# Patient Record
Sex: FEMALE | Race: BLACK OR AFRICAN AMERICAN | NOT HISPANIC OR LATINO | Employment: UNEMPLOYED | URBAN - METROPOLITAN AREA
[De-identification: names, ages, dates, MRNs, and addresses within clinical notes are randomized per-mention and may not be internally consistent; named-entity substitution may affect disease eponyms.]

---

## 2020-06-19 ENCOUNTER — HOSPITAL ENCOUNTER (EMERGENCY)
Facility: HOSPITAL | Age: 50
Discharge: HOME | End: 2020-06-20
Attending: FAMILY MEDICINE
Payer: OTHER GOVERNMENT

## 2020-06-19 VITALS
DIASTOLIC BLOOD PRESSURE: 78 MMHG | OXYGEN SATURATION: 100 % | SYSTOLIC BLOOD PRESSURE: 148 MMHG | WEIGHT: 193 LBS | HEART RATE: 70 BPM | TEMPERATURE: 98 F | RESPIRATION RATE: 20 BRPM

## 2020-06-19 DIAGNOSIS — U07.1 2019 NOVEL CORONAVIRUS DISEASE (COVID-19): Primary | ICD-10-CM

## 2020-06-19 LAB — SARS-COV-2 RDRP RESP QL NAA+PROBE: POSITIVE

## 2020-06-19 PROCEDURE — U0002 COVID-19 LAB TEST NON-CDC: HCPCS

## 2020-06-19 PROCEDURE — 99284 EMERGENCY DEPT VISIT MOD MDM: CPT

## 2020-06-19 NOTE — Clinical Note
Annabelle Kruger was seen and treated in our emergency department on 6/19/2020.  She may return to work on 06/27/2020.       If you have any questions or concerns, please don't hesitate to call.      Arianne Martines NP

## 2020-06-20 DIAGNOSIS — U07.1 COVID-19 VIRUS DETECTED: ICD-10-CM

## 2020-06-20 RX ORDER — IBUPROFEN 800 MG/1
800 TABLET ORAL EVERY 6 HOURS PRN
Qty: 20 TABLET | Refills: 0 | Status: SHIPPED | OUTPATIENT
Start: 2020-06-20

## 2020-06-20 RX ORDER — ONDANSETRON 4 MG/1
4 TABLET, FILM COATED ORAL EVERY 6 HOURS
Qty: 15 TABLET | Refills: 0 | Status: SHIPPED | OUTPATIENT
Start: 2020-06-20

## 2020-06-20 RX ORDER — ACETAMINOPHEN 500 MG
1000 TABLET ORAL EVERY 6 HOURS PRN
Qty: 24 TABLET | Refills: 0 | Status: SHIPPED | OUTPATIENT
Start: 2020-06-20 | End: 2020-06-27

## 2020-06-20 RX ORDER — DEXCHLORPHENIRAMINE MALEATE, DEXTROMETHORPHAN HBR, PHENYLEPHRINE HCL 1; 10; 5 MG/5ML; MG/5ML; MG/5ML
10 SYRUP ORAL EVERY 6 HOURS PRN
Qty: 1 BOTTLE | Refills: 0 | Status: SHIPPED | OUTPATIENT
Start: 2020-06-20 | End: 2020-06-27

## 2020-06-20 NOTE — ED PROVIDER NOTES
History      Chief Complaint   Patient presents with    COVID-19 Concerns      reports PUENTE, chills, onset wednesday; yesterday onset no sense of smell. states daughter tested positive       Review of patient's allergies indicates:  No Known Allergies     HPI   HPI    6/20/2020, 12:04 AM   History obtained from the spouse and patient     History of Present Illness: Annabelle Kruger is 49 y.o. female patient with no PMHx  who presents to the Emergency Department for evaluation of headaches, body aches, cough with onset 2 days ago. Pt reports been in close contact with the money to tested positive for COVID-19. The pt symptoms are constant and moderate in severity. Patient denies any shortness of breath, body aches, cough, chest pain, abdominal pain, nausea vomiting diarrhea, fever, headache, dizziness, weakness at present:, and all other sxs at this time. Prior Tx includes over-the-counter medications. No further complaints or concerns at this time.     Arrival mode: Personal vehicle      PCP: No primary care provider on file.       Past Medical History:  No past medical history on file.    Past Surgical History:  No past surgical history on file.      Family History:  No family history on file.    Social History:  Social History     Tobacco Use    Smoking status: Not on file   Substance and Sexual Activity    Alcohol use: Not on file    Drug use: Not on file    Sexual activity: Not on file       ROS   Review of Systems   Constitutional: Negative for chills.   HENT: Negative for congestion, ear pain, postnasal drip, sinus pain, sneezing and sore throat.    Eyes: Negative for pain.   Respiratory: Positive for cough. Negative for chest tightness, shortness of breath and wheezing.    Cardiovascular: Negative for chest pain.   Gastrointestinal: Negative for abdominal pain, constipation, diarrhea, nausea and vomiting.   Genitourinary: Negative for decreased urine volume, difficulty urinating, dysuria, flank pain,  frequency, hematuria, vaginal bleeding and vaginal discharge.   Musculoskeletal: Positive for myalgias. Negative for back pain, gait problem and neck pain.   Skin: Negative for pallor and rash.   Neurological: Positive for headaches. Negative for dizziness, syncope and weakness.       Physical Exam      Initial Vitals [06/19/20 2235]   BP Pulse Resp Temp SpO2   (!) 148/78 70 20 98.2 °F (36.8 °C) 100 %      MAP       --          Physical Exam   Constitutional: She is oriented to person, place, and time. She appears well-developed and well-nourished. No distress.   HENT:   Head: Normocephalic and atraumatic.   Mouth/Throat: Oropharynx is clear and moist.   Eyes: Pupils are equal, round, and reactive to light. Conjunctivae and EOM are normal.   Neck: Normal range of motion. Neck supple. No neck rigidity. Normal range of motion present.   Cardiovascular: Normal rate, regular rhythm and normal heart sounds.   Pulmonary/Chest: Effort normal and breath sounds normal. No respiratory distress. She exhibits no tenderness.   Abdominal: Soft. Bowel sounds are normal. There is no abdominal tenderness. There is no rigidity, no rebound and no guarding.   Musculoskeletal: Normal range of motion.   Neurological: She is alert and oriented to person, place, and time.   Skin: Skin is warm and dry.       Nursing Notes and Vital Signs Reviewed.    ED Course    Procedures  ED Vital Signs:  Vitals:    06/19/20 2235   BP: (!) 148/78   Pulse: 70   Resp: 20   Temp: 98.2 °F (36.8 °C)   TempSrc: Oral   SpO2: 100%   Weight: 87.5 kg (193 lb 0.2 oz)       Abnormal Lab Results:  Labs Reviewed   SARS-COV-2 RNA AMPLIFICATION, QUAL - Abnormal; Notable for the following components:       Result Value    SARS-CoV-2 RNA, Amplification, Qual Positive (*)     All other components within normal limits        All Lab Results:  Results for orders placed or performed during the hospital encounter of 06/19/20   COVID-19 Rapid Screening   Result Value Ref Range     SARS-CoV-2 RNA, Amplification, Qual Positive (A) Negative         Imaging Results:  Imaging Results    None                       The Emergency Provider reviewed the vital signs and test results, which are outlined above.    ED Discussion     12:09 AM: I discussed with patient that their Co-Vid 19 swab was positive.  Patient is stable nontoxic and ready for discharge home.  The pt denies any shortness of breath, fever, body aches, nausea/vomiting, chest pain, abdominal pain, dizziness, weakness at present time. I informed the pt of discharge instructions,will be prescribing discharge medications, and recommended they return if symptoms worsen or for any concerns.  Counseled pt at length to continue infection control precautions like covering her mouth when coughing, washing hands frequently, and minimizing contact with others whenever possible, and following all current CDC and state recommendations.     I informed pt of signs and symptoms of when to immediately return to ER which include but not limited to fever greater than 100.4, worsening cough, shortness of breath, chest pain, abdominal pain, vomiting, inability to tolerate anything by mouth, fatigue, dizziness, weakness, or any concerns.  The patient verbalized agreement understanding of treatment discharge plan.  All questions were answered.  The patient informed to use resources that were given in discharge instructions to help him find a primary care doctor.  Patient informed to follow-up with a primary care doctor or return to emergency room for any concerns.  Patient is stable for discharge home.    I discussed with patient and/or family/caretaker that evaluation in the ED does not suggest any emergent or life threatening medical conditions requiring immediate intervention beyond what was provided in the ED, and I believe patient is safe for discharge.  Regardless, an unremarkable evaluation in the ED does not preclude the development or presence of a  serious of life threatening condition. As such, patient was instructed to return immediately for any worsening or change in current symptoms.      ED Medication(s):  Medications - No data to display  New Prescriptions    ACETAMINOPHEN (TYLENOL) 500 MG TABLET    Take 2 tablets (1,000 mg total) by mouth every 6 (six) hours as needed for Pain or Temperature greater than (fever above 100.4).    DEXCHLORPHEN-PHENYLEPHRINE-DM (POLYTUSSIN DM) 1-5-10 MG/5 ML SYRP    Take 10 mLs by mouth every 6 (six) hours as needed (cough).    IBUPROFEN (ADVIL,MOTRIN) 800 MG TABLET    Take 1 tablet (800 mg total) by mouth every 6 (six) hours as needed for Pain.    ONDANSETRON (ZOFRAN) 4 MG TABLET    Take 1 tablet (4 mg total) by mouth every 6 (six) hours.              Medical Decision Making                     Clinical Impression       ICD-10-CM ICD-9-CM   1. 2019 novel coronavirus disease (COVID-19)  U07.1        Disposition:   Disposition: Discharged  Condition: Stable         Arianne Martines NP  06/20/20 0013

## 2020-06-20 NOTE — DISCHARGE INSTRUCTIONS
Return to ED for any worsening of symptoms or increase in shortness of breath, chest pain, fever that does not decrease with medications (Tylenol or Ibuprofen), vomiting and unable to tolerate liquids, abdominal pain, dizziness, weakness or any concerns.     Take Tylenol as label directs for fever greater than 100.4. Take all medications as prescribed.